# Patient Record
Sex: FEMALE | Race: WHITE
[De-identification: names, ages, dates, MRNs, and addresses within clinical notes are randomized per-mention and may not be internally consistent; named-entity substitution may affect disease eponyms.]

---

## 2017-06-12 ENCOUNTER — HOSPITAL ENCOUNTER (OUTPATIENT)
Dept: HOSPITAL 75 - RAD | Age: 62
End: 2017-06-12
Attending: INTERNAL MEDICINE
Payer: MEDICAID

## 2017-06-12 DIAGNOSIS — C50.212: Primary | ICD-10-CM

## 2017-07-11 ENCOUNTER — HOSPITAL ENCOUNTER (OUTPATIENT)
Dept: HOSPITAL 75 - ONC | Age: 62
LOS: 81 days | Discharge: HOME | End: 2017-09-30
Attending: INTERNAL MEDICINE
Payer: MEDICAID

## 2017-07-11 DIAGNOSIS — E66.9: ICD-10-CM

## 2017-07-11 DIAGNOSIS — Z92.3: ICD-10-CM

## 2017-07-11 DIAGNOSIS — C50.212: Primary | ICD-10-CM

## 2017-07-11 DIAGNOSIS — I10: ICD-10-CM

## 2017-07-11 DIAGNOSIS — Z17.0: ICD-10-CM

## 2017-07-11 DIAGNOSIS — E11.9: ICD-10-CM

## 2017-07-11 DIAGNOSIS — F17.210: ICD-10-CM

## 2017-07-11 DIAGNOSIS — Z90.10: ICD-10-CM

## 2017-07-11 DIAGNOSIS — Z92.21: ICD-10-CM

## 2017-07-11 LAB
ALBUMIN SERPL-MCNC: 4 GM/DL (ref 3.2–4.5)
ALT SERPL-CCNC: 13 U/L (ref 0–55)
ANION GAP SERPL CALC-SCNC: 8 MMOL/L (ref 5–14)
AST SERPL-CCNC: 11 U/L (ref 5–34)
BASOPHILS # BLD AUTO: 0.1 10^3/UL (ref 0–0.1)
BASOPHILS NFR BLD AUTO: 1 % (ref 0–10)
BILIRUB SERPL-MCNC: 0.4 MG/DL (ref 0.1–1)
BUN SERPL-MCNC: 12 MG/DL (ref 7–18)
BUN/CREAT SERPL: 16
CALCIUM SERPL-MCNC: 9.2 MG/DL (ref 8.5–10.1)
CHLORIDE SERPL-SCNC: 104 MMOL/L (ref 98–107)
CO2 SERPL-SCNC: 30 MMOL/L (ref 21–32)
CREAT SERPL-MCNC: 0.73 MG/DL (ref 0.6–1.3)
EOSINOPHIL # BLD AUTO: 0.1 10^3/UL (ref 0–0.3)
EOSINOPHIL NFR BLD AUTO: 1 % (ref 0–10)
ERYTHROCYTE [DISTWIDTH] IN BLOOD BY AUTOMATED COUNT: 12.6 % (ref 10–14.5)
GFR SERPLBLD BASED ON 1.73 SQ M-ARVRAT: > 60 ML/MIN
GLUCOSE SERPL-MCNC: 98 MG/DL (ref 70–105)
LYMPHOCYTES # BLD AUTO: 3.2 X 10^3 (ref 1–4)
LYMPHOCYTES NFR BLD AUTO: 29 % (ref 12–44)
MCH RBC QN AUTO: 33 PG (ref 25–34)
MCHC RBC AUTO-ENTMCNC: 35 G/DL (ref 32–36)
MCV RBC AUTO: 97 FL (ref 80–99)
MONOCYTES # BLD AUTO: 0.6 X 10^3 (ref 0–1)
MONOCYTES NFR BLD AUTO: 5 % (ref 0–12)
NEUTROPHILS # BLD AUTO: 7.2 X 10^3 (ref 1.8–7.8)
NEUTROPHILS NFR BLD AUTO: 64 % (ref 42–75)
PLATELET # BLD: 188 10^3/UL (ref 130–400)
PMV BLD AUTO: 11.4 FL (ref 7.4–10.4)
POTASSIUM SERPL-SCNC: 3.8 MMOL/L (ref 3.6–5)
PROT SERPL-MCNC: 7.1 GM/DL (ref 6.4–8.2)
RBC # BLD AUTO: 4.7 10^6/UL (ref 4.35–5.85)
SODIUM SERPL-SCNC: 142 MMOL/L (ref 135–145)
WBC # BLD AUTO: 11.1 10^3/UL (ref 4.3–11)

## 2017-07-11 PROCEDURE — 80053 COMPREHEN METABOLIC PANEL: CPT

## 2017-07-11 PROCEDURE — 85025 COMPLETE CBC W/AUTO DIFF WBC: CPT

## 2017-07-11 PROCEDURE — 36415 COLL VENOUS BLD VENIPUNCTURE: CPT

## 2017-07-11 PROCEDURE — 99213 OFFICE O/P EST LOW 20 MIN: CPT

## 2017-07-11 PROCEDURE — 83036 HEMOGLOBIN GLYCOSYLATED A1C: CPT

## 2018-02-08 ENCOUNTER — HOSPITAL ENCOUNTER (OUTPATIENT)
Dept: HOSPITAL 75 - RAD | Age: 63
End: 2018-02-08
Attending: NURSE PRACTITIONER
Payer: MEDICAID

## 2018-02-08 DIAGNOSIS — C50.212: Primary | ICD-10-CM

## 2018-02-08 DIAGNOSIS — Z78.0: ICD-10-CM

## 2018-02-08 DIAGNOSIS — M85.88: ICD-10-CM

## 2018-02-08 DIAGNOSIS — E11.9: ICD-10-CM

## 2018-02-08 PROCEDURE — 77080 DXA BONE DENSITY AXIAL: CPT

## 2018-02-08 NOTE — DIAGNOSTIC IMAGING REPORT
INDICATION: Diabetes, osteoporosis. 



COMPARISON: 9/13/2013. 



FINDINGS: Bone mineralization of the lumbar spine is 0.979.

Previously this was measured at 1.032. There has been an interval

reduction in bone mineralization of 5.1%. The lumbar T-score is

-1.8. Bone mineralization of the left hip is 0.901, the right hip

is 0.856. The left hip T-score is -0.8, the right is -1.1.

Previously the bone mineralization in the hips was 1.110. This

represents a reduction of 20.5%.



IMPRESSION: Osteopenia of the right hip and lumbar spine.

Borderline normal bone mineralization of the left hip. 



Dictated by: 



  Dictated on workstation # [id number]

## 2018-06-15 ENCOUNTER — HOSPITAL ENCOUNTER (OUTPATIENT)
Dept: HOSPITAL 75 - RAD | Age: 63
End: 2018-06-15
Attending: NURSE PRACTITIONER
Payer: MEDICAID

## 2018-06-15 DIAGNOSIS — C50.212: Primary | ICD-10-CM

## 2018-06-15 DIAGNOSIS — Z90.12: ICD-10-CM

## 2018-06-15 NOTE — DIAGNOSTIC IMAGING REPORT
INDICATION: 

Left breast carcinoma status post mastectomy.



COMPARISON:  

Prior right breast mammogram from 06/12/2017 and 06/06/2016.



TECHNIQUE: 

2D and 3D unilateral right diagnostic mammography was performed

with CAD.



FINDINGS:

Scattered fibroglandular densities in the right breast are

stable. There are benign calcifications in the right breast. No

spiculated mass or malignant appearing microcalcifications are

seen. Benign nodules in the upper outer right breast are noted,

consistent with intramammary lymph nodes. The right axilla is

unremarkable.



IMPRESSION:   

No mammographic features suspicious for malignancy are

identified.



ACR BI-RADS Category 2: Benign findings.

Result letter will be mailed to the patient.

Note: At least 10% of breast cancer is not imaged by mammography.



Dictated by: 



  Dictated on workstation # [id number]

## 2018-07-26 ENCOUNTER — HOSPITAL ENCOUNTER (OUTPATIENT)
Dept: HOSPITAL 75 - ONC | Age: 63
LOS: 5 days | Discharge: HOME | End: 2018-07-31
Attending: INTERNAL MEDICINE
Payer: MEDICAID

## 2018-07-26 DIAGNOSIS — Z90.10: ICD-10-CM

## 2018-07-26 DIAGNOSIS — F17.210: ICD-10-CM

## 2018-07-26 DIAGNOSIS — I10: ICD-10-CM

## 2018-07-26 DIAGNOSIS — Z17.0: ICD-10-CM

## 2018-07-26 DIAGNOSIS — C50.212: Primary | ICD-10-CM

## 2018-07-26 DIAGNOSIS — E66.9: ICD-10-CM

## 2018-07-26 DIAGNOSIS — E11.9: ICD-10-CM

## 2018-07-26 DIAGNOSIS — Z92.21: ICD-10-CM

## 2018-07-26 DIAGNOSIS — Z92.3: ICD-10-CM

## 2018-07-26 LAB
ALBUMIN SERPL-MCNC: 4.3 GM/DL (ref 3.2–4.5)
ALP SERPL-CCNC: 79 U/L (ref 40–136)
ALT SERPL-CCNC: 16 U/L (ref 0–55)
BASOPHILS # BLD AUTO: 0 10^3/UL (ref 0–0.1)
BASOPHILS NFR BLD AUTO: 0 % (ref 0–10)
BILIRUB SERPL-MCNC: 0.6 MG/DL (ref 0.1–1)
BUN/CREAT SERPL: 24
CALCIUM SERPL-MCNC: 9.3 MG/DL (ref 8.5–10.1)
CHLORIDE SERPL-SCNC: 104 MMOL/L (ref 98–107)
CO2 SERPL-SCNC: 27 MMOL/L (ref 21–32)
CREAT SERPL-MCNC: 0.83 MG/DL (ref 0.6–1.3)
EOSINOPHIL # BLD AUTO: 0.1 10^3/UL (ref 0–0.3)
EOSINOPHIL NFR BLD AUTO: 2 % (ref 0–10)
ERYTHROCYTE [DISTWIDTH] IN BLOOD BY AUTOMATED COUNT: 12.8 % (ref 10–14.5)
GFR SERPLBLD BASED ON 1.73 SQ M-ARVRAT: > 60 ML/MIN
GLUCOSE SERPL-MCNC: 117 MG/DL (ref 70–105)
HCT VFR BLD CALC: 44 % (ref 35–52)
HGB BLD-MCNC: 15.7 G/DL (ref 11.5–16)
LYMPHOCYTES # BLD AUTO: 3.2 X 10^3 (ref 1–4)
LYMPHOCYTES NFR BLD AUTO: 35 % (ref 12–44)
MANUAL DIFFERENTIAL PERFORMED BLD QL: NO
MCH RBC QN AUTO: 34 PG (ref 25–34)
MCHC RBC AUTO-ENTMCNC: 36 G/DL (ref 32–36)
MCV RBC AUTO: 95 FL (ref 80–99)
MONOCYTES # BLD AUTO: 0.6 X 10^3 (ref 0–1)
MONOCYTES NFR BLD AUTO: 6 % (ref 0–12)
NEUTROPHILS # BLD AUTO: 5.2 X 10^3 (ref 1.8–7.8)
NEUTROPHILS NFR BLD AUTO: 57 % (ref 42–75)
PLATELET # BLD: 164 10^3/UL (ref 130–400)
PMV BLD AUTO: 11.2 FL (ref 7.4–10.4)
POTASSIUM SERPL-SCNC: 4.6 MMOL/L (ref 3.6–5)
PROT SERPL-MCNC: 7.6 GM/DL (ref 6.4–8.2)
RBC # BLD AUTO: 4.59 10^6/UL (ref 4.35–5.85)
SODIUM SERPL-SCNC: 139 MMOL/L (ref 135–145)
WBC # BLD AUTO: 9.2 10^3/UL (ref 4.3–11)

## 2018-07-26 PROCEDURE — 99213 OFFICE O/P EST LOW 20 MIN: CPT

## 2018-07-26 PROCEDURE — 85025 COMPLETE CBC W/AUTO DIFF WBC: CPT

## 2018-07-26 PROCEDURE — 36415 COLL VENOUS BLD VENIPUNCTURE: CPT

## 2018-07-26 PROCEDURE — 80053 COMPREHEN METABOLIC PANEL: CPT

## 2019-07-08 ENCOUNTER — HOSPITAL ENCOUNTER (OUTPATIENT)
Dept: HOSPITAL 75 - RAD | Age: 64
End: 2019-07-08
Attending: INTERNAL MEDICINE
Payer: MEDICAID

## 2019-07-08 DIAGNOSIS — C50.919: ICD-10-CM

## 2019-07-08 DIAGNOSIS — Z12.31: Primary | ICD-10-CM

## 2019-07-08 NOTE — DIAGNOSTIC IMAGING REPORT
INDICATION: 

Left breast carcinoma.



COMPARISON: 

06/15/2018 and 06/12/2017.



TECHNIQUE: 

Right 2D and 3D diagnostic mammography was performed with CAD.



FINDINGS:

Scattered fibroglandular densities in the right breast are noted.

There are benign calcifications. No spiculated mass or malignant

appearing microcalcifications are seen. The right axilla is

unremarkable.



IMPRESSION: 

No mammographic features suspicious for malignancy are

identified.



ACR BI-RADS Category 2: Benign findings.

Result letter will be mailed to the patient.

Note: At least 10% of breast cancer is not imaged by mammography.



Dictated by: 



  Dictated on workstation # [id number]

## 2019-07-26 ENCOUNTER — HOSPITAL ENCOUNTER (OUTPATIENT)
Dept: HOSPITAL 75 - ONC | Age: 64
End: 2019-07-26
Attending: INTERNAL MEDICINE
Payer: MEDICAID

## 2019-07-26 DIAGNOSIS — C50.919: Primary | ICD-10-CM

## 2019-07-26 LAB
ALBUMIN SERPL-MCNC: 4.3 GM/DL (ref 3.2–4.5)
ALP SERPL-CCNC: 96 U/L (ref 40–136)
ALT SERPL-CCNC: 44 U/L (ref 0–55)
BASOPHILS # BLD AUTO: 0 10^3/UL (ref 0–0.1)
BASOPHILS NFR BLD AUTO: 0 % (ref 0–10)
BILIRUB SERPL-MCNC: 0.7 MG/DL (ref 0.1–1)
BUN/CREAT SERPL: 14
CALCIUM SERPL-MCNC: 9.8 MG/DL (ref 8.5–10.1)
CHLORIDE SERPL-SCNC: 103 MMOL/L (ref 98–107)
CO2 SERPL-SCNC: 24 MMOL/L (ref 21–32)
CREAT SERPL-MCNC: 0.91 MG/DL (ref 0.6–1.3)
EOSINOPHIL # BLD AUTO: 0.2 10^3/UL (ref 0–0.3)
EOSINOPHIL NFR BLD AUTO: 2 % (ref 0–10)
ERYTHROCYTE [DISTWIDTH] IN BLOOD BY AUTOMATED COUNT: 12.6 % (ref 10–14.5)
GFR SERPLBLD BASED ON 1.73 SQ M-ARVRAT: > 60 ML/MIN
GLUCOSE SERPL-MCNC: 147 MG/DL (ref 70–105)
HCT VFR BLD CALC: 45 % (ref 35–52)
HGB BLD-MCNC: 15.7 G/DL (ref 11.5–16)
LYMPHOCYTES # BLD AUTO: 2.8 X 10^3 (ref 1–4)
LYMPHOCYTES NFR BLD AUTO: 39 % (ref 12–44)
MANUAL DIFFERENTIAL PERFORMED BLD QL: NO
MCH RBC QN AUTO: 32 PG (ref 25–34)
MCHC RBC AUTO-ENTMCNC: 35 G/DL (ref 32–36)
MCV RBC AUTO: 93 FL (ref 80–99)
MONOCYTES # BLD AUTO: 0.4 X 10^3 (ref 0–1)
MONOCYTES NFR BLD AUTO: 5 % (ref 0–12)
NEUTROPHILS # BLD AUTO: 3.8 X 10^3 (ref 1.8–7.8)
NEUTROPHILS NFR BLD AUTO: 53 % (ref 42–75)
PLATELET # BLD: 156 10^3/UL (ref 130–400)
PMV BLD AUTO: 11.4 FL (ref 7.4–10.4)
POTASSIUM SERPL-SCNC: 4.2 MMOL/L (ref 3.6–5)
PROT SERPL-MCNC: 7.8 GM/DL (ref 6.4–8.2)
SODIUM SERPL-SCNC: 137 MMOL/L (ref 135–145)
WBC # BLD AUTO: 7.2 10^3/UL (ref 4.3–11)

## 2019-07-26 PROCEDURE — 85025 COMPLETE CBC W/AUTO DIFF WBC: CPT

## 2019-07-26 PROCEDURE — 80053 COMPREHEN METABOLIC PANEL: CPT

## 2019-07-26 PROCEDURE — 99213 OFFICE O/P EST LOW 20 MIN: CPT

## 2019-07-26 PROCEDURE — 36415 COLL VENOUS BLD VENIPUNCTURE: CPT

## 2021-12-22 NOTE — DIAGNOSTIC IMAGING REPORT
Right breast diagnostic mammogram.



INDICATION: History of breast cancer status post left mastectomy.



CAD is utilized.



COMPARISON: 6/4/2015.



FINDINGS:

The right breast is composed of scattered fibroglandular

densities. Punctate calcifications are seen. No mass,

architectural distortion, or suspicious cluster of calcification

noted. Allowing for technique and positional differences, no

suspicious change is seen.



IMPRESSION: No significant change.



ACR BI-RADS Category 2: Benign findings.

Result letter will be mailed to the patient.

Note: At least 10% of breast cancer is not imaged by mammography.



Dictated by: 



  Dictated on workstation # [id number]
denies pain/discomfort

## 2023-06-27 ENCOUNTER — HOSPITAL ENCOUNTER (EMERGENCY)
Dept: HOSPITAL 75 - ER | Age: 68
LOS: 1 days | Discharge: ANOTHER HOSPITAL | End: 2023-06-28
Payer: MEDICARE

## 2023-06-27 DIAGNOSIS — E11.9: ICD-10-CM

## 2023-06-27 DIAGNOSIS — E66.9: ICD-10-CM

## 2023-06-27 DIAGNOSIS — R70.1: ICD-10-CM

## 2023-06-27 DIAGNOSIS — I50.9: Primary | ICD-10-CM

## 2023-06-27 LAB
APTT BLD: 45 SEC (ref 24–35)
BASOPHILS # BLD AUTO: 0 10^3/UL (ref 0–0.1)
BASOPHILS NFR BLD AUTO: 0 % (ref 0–10)
EOSINOPHIL # BLD AUTO: 0.1 10^3/UL (ref 0–0.3)
EOSINOPHIL NFR BLD AUTO: 1 % (ref 0–10)
HCT VFR BLD CALC: 34 % (ref 35–52)
HGB BLD-MCNC: 10 G/DL (ref 11.5–16)
INR PPP: 1.5 (ref 0.8–1.4)
LYMPHOCYTES # BLD AUTO: 6 10^3/UL (ref 1–4)
LYMPHOCYTES NFR BLD AUTO: 59 % (ref 12–44)
MCH RBC QN AUTO: 29 PG (ref 25–34)
MCHC RBC AUTO-ENTMCNC: 30 G/DL (ref 32–36)
MCV RBC AUTO: 99 FL (ref 80–99)
MONOCYTES # BLD AUTO: 0.9 10^3/UL (ref 0–1)
MONOCYTES NFR BLD AUTO: 8 % (ref 0–12)
NEUTROPHILS # BLD AUTO: 3.2 10^3/UL (ref 1.8–7.8)
NEUTROPHILS NFR BLD AUTO: 32 % (ref 42–75)
PLATELET # BLD: 185 10^3/UL (ref 130–400)
PMV BLD AUTO: 10.5 FL (ref 9–12.2)
PROTHROMBIN TIME: 18.6 SEC (ref 12.2–14.7)
WBC # BLD AUTO: 10.2 10^3/UL (ref 4.3–11)

## 2023-06-27 PROCEDURE — 85730 THROMBOPLASTIN TIME PARTIAL: CPT

## 2023-06-27 PROCEDURE — 84484 ASSAY OF TROPONIN QUANT: CPT

## 2023-06-27 PROCEDURE — 85610 PROTHROMBIN TIME: CPT

## 2023-06-27 PROCEDURE — 93005 ELECTROCARDIOGRAM TRACING: CPT

## 2023-06-27 PROCEDURE — 70450 CT HEAD/BRAIN W/O DYE: CPT

## 2023-06-27 PROCEDURE — 36415 COLL VENOUS BLD VENIPUNCTURE: CPT

## 2023-06-27 PROCEDURE — 80053 COMPREHEN METABOLIC PANEL: CPT

## 2023-06-27 PROCEDURE — 71045 X-RAY EXAM CHEST 1 VIEW: CPT

## 2023-06-27 PROCEDURE — 85025 COMPLETE CBC W/AUTO DIFF WBC: CPT

## 2023-06-27 NOTE — DIAGNOSTIC IMAGING REPORT
INDICATION: Weakness and shortness of breath.



FINDINGS: The heart size is normal. There is mild venous

congestion. There is elevation of the right hemidiaphragm. There

is no pleural effusion or pneumothorax.  The mediastinum is

unremarkable. 



IMPRESSION: Mild central pulmonary venous congestion, otherwise

unremarkable.



Dictated by: 



  Dictated on workstation # [id number]

## 2023-06-27 NOTE — ED GENERAL
General


Stated Complaint:  WEAKNESS


Source of Information:  Patient, Family


Exam Limitations:  No Limitations





History of Present Illness


Date Seen by Provider:  2023


Time Seen by Provider:  19:55


Initial Comments


Patient is a 67-year-old female who presents to the emergency room, transferred 

from [facility] chief complaint concern for hyperviscous blood, 

dyspnea on exertion, weakness, blurry vision, dizziness.  Patient has had the 

symptoms progressing over the course of the last week (to up to 3 weeks).  She 

went to the emergency department in [facility] and they were unable to run 

labs due to how thick her blood was.  She tells me in March she had pneumonia 

and had "normal" blood work done.  She has a history of breast cancer treated at

least 10/12 years ago.  No follow-up in the last 7y with oncology.  She denies 

blood in her urine.  She denies blood in her stool.  She does "easily bruise".  

Noted to have petechial rash to the dorsum of her feet as well as her left arm. 

She has a history of Mnire's disease so dizziness is not unusual.  She states 

she has diffuse pain in her ribs, back, hips and legs.  No history of 

hematological malignancy.  She denies headache.





The reason they became concerned with ongoing symptoms - she checked her blood 

sugar 1 week ago today on a family member's monitor and it was "480".  Otherwise

glucose monitors have been unable to read her sugar.  She was Type to DM many 

years ago, on metformin but lost weight and changed lifestyle and came off 

metformin. Due to symptoms recently, patient has been taking her 's 

metformin (only a couple of doses the last week).





Abiola tells me she had a brother die of a "blood cancer" (not sure what it 

was).





Dr. Power, the emergency room physician at [facility] spoke with Dr. Cruz

our oncologist.  She recommended transfer to [facility] for further evaluation,

possible admission.


Timing/Duration:  1 Week


Severity:  Moderate


Associated Systoms:  Malaise, Shortness of Air, Weakness





Allergies and Home Medications


Allergies


Coded Allergies:  


     adhesive (Unverified  Allergy, Unknown, 23)





Patient Home Medication List


Home Medication List Reviewed:  Yes


Codeine Phos/Acetaminophen (Tylenol-Codeine #3 Tablet) 1 Tab Tablet, 1-2 TAB PO 

Q4H PRN for PAIN


   Prescribed by: MECHELLE REGAN on 14 1556


Metformin Hcl (Metformin 1000 Mg) 1,000 Mg Tablet, 1 EACH PO DAILY, (Reported)


   Entered as Reported by: ELLIOTT CERRATO on 4/3/14 1647


[Med For Ca Name?]  , PO DAILY, (Reported)


   Entered as Reported by: ELLIOTT CERRATO on 4/3/14 1647





Review of Systems


Review of Systems


Constitutional:  see HPI, malaise, weakness


EENTM:  blurred vision


Respiratory:  short of breath


Cardiovascular:  no symptoms reported


Gastrointestinal:  no symptoms reported


Genitourinary:  no symptoms reported


Musculoskeletal:  other (body aches)


Skin:  other (bruising)


Psychiatric/Neurological:  No Symptoms Reported


Hematologic/Lymphatic:  Easy Bruising





All Other Systems Reviewed


Negative Unless Noted:  Yes





Past Medical-Social-Family Hx


Past Medical History


Reproductive Disorders:  Yes


Female Reproductive Disorders:  Ovarian Cyst


Breast





Physical Exam


Vital Signs





Vital Signs - First Documented








 23





 19:58


 


Pulse 99


 


Resp 22


 


B/P (MAP) 181/106 (131)


 


Pulse Ox 95


 


O2 Delivery Room Air





Capillary Refill :


Height, Weight, BMI


Height: 5'1.00"


Weight: 199lbs. 0.0oz. 90.775126gs;  BMI


Method:


General Appearance:  No Apparent Distress, WD/WN, Obese


Eyes:  Bilateral Eye Normal Inspection, Bilateral Eye PERRL, Bilateral Eye EOMI


HEENT:  PERRL/EOMI, Moist Mucous Membranes


Neck:  Normal Inspection


Respiratory:  No Accessory Muscle Use, No Respiratory Distress, Other (coarse 

ronchus BS throughout)


Cardiovascular:  Regular Rate, Rhythm, Normal Peripheral Pulses, Tachycardia 

(100)


Gastrointestinal:  Non Tender, Soft


Extremity:  Normal Range of Motion, Pedal Edema (1-2+)


Neurologic/Psychiatric:  Alert, Oriented x3, No Motor/Sensory Deficits, Normal M

ood/Affect, CNs II-XII Norm as Tested


Skin:  Warm/Dry, Pallor ("off color" purplish blue (not actually cyanotic 

however)), Petechia (dorsum of feet bilat; left arm; scattered to ant abd)





Progress/Results/Core Measures


Suspected Sepsis


SIRS


Temperature: 


Pulse:  


Respiratory Rate: 


 


Laboratory Tests


23 20:10: White Blood Count 10.2


Blood Pressure  / 


Mean: 


 





Laboratory Tests


23 20:10: 


INR Comment 1.5H, Platelet Count 185








Results/Orders


Lab Results





Laboratory Tests








Test


 23


20:10 Range/Units


 


 


White Blood Count


 10.2 


 4.3-11.0


10^3/uL


 


Red Blood Count


 3.40 L


 3.80-5.11


10^6/uL


 


Hemoglobin 10.0 L 11.5-16.0  g/dL


 


Hematocrit 34 L 35-52  %


 


Mean Corpuscular Volume 99  80-99  fL


 


Mean Corpuscular Hemoglobin 29  25-34  pg


 


Mean Corpuscular Hemoglobin


Concent 30 L


 32-36  g/dL





 


Red Cell Distribution Width 14.6 H 10.0-14.5  %


 


Platelet Count


 185 


 130-400


10^3/uL


 


Mean Platelet Volume 10.5  9.0-12.2  fL


 


Immature Granulocyte % (Auto) 1   %


 


Neutrophils (%) (Auto) 32 L 42-75  %


 


Lymphocytes (%) (Auto) 59 H 12-44  %


 


Monocytes (%) (Auto) 8  0-12  %


 


Eosinophils (%) (Auto) 1  0-10  %


 


Basophils (%) (Auto) 0  0-10  %


 


Neutrophils # (Auto)


 3.2 


 1.8-7.8


10^3/uL


 


Lymphocytes # (Auto)


 6.0 H


 1.0-4.0


10^3/uL


 


Monocytes # (Auto)


 0.9 


 0.0-1.0


10^3/uL


 


Eosinophils # (Auto)


 0.1 


 0.0-0.3


10^3/uL


 


Basophils # (Auto)


 0.0 


 0.0-0.1


10^3/uL


 


Immature Granulocyte # (Auto)


 0.1 


 0.0-0.1


10^3/uL


 


Percent Immature Platelet


Fraction 0.8 


 0.0-7.6  %





 


Prothrombin Time 18.6 H 12.2-14.7  SEC


 


INR Comment 1.5 H 0.8-1.4  


 


Activated Partial


Thromboplast Time 45 H


 24-35  SEC





 


Smear Scan    








My Orders





Orders - DANIEL KHALIL MD


Ed Iv/Invasive Line Start (23 20:22)


Cbc With Automated Diff (23 20:22)


Comprehensive Metabolic Panel (23 20:22)


Protime With Inr (23 20:22)


Partial Thromboplastin Time (23 20:22)


Chest 1 View, Ap/Pa Only (23 20:22)


Ekg Tracing (23 20:23)


Bnp Terrell (23 20:23)


Troponin I Radha (23 20:23)


I-Stat Bedside Testing (23 21:27)


Ct Head Wo (23 21:42)


Accucheck Stat ONCE (23 22:55)


Ns Iv 1000 Ml (Sodium Chloride 0.9%) (23 23:02)





Vital Signs/I&O











 23





 19:58


 


Pulse 99


 


Resp 22


 


B/P (MAP) 181/106 (131)


 


Pulse Ox 95


 


O2 Delivery Room Air





Capillary Refill :


Progress Note #1:  


   Time:  21:29


Progress Note


Notified by lab that the analyzers are unable to perform CBC and CH12 due to the

increased thickness of the patient's blood.  is attempting dilutional 

techniques.  Will try and at least Istat basic chemistry.


Progress Note #2:  


   Time:  22:23


Progress Note


Discussed with Dr Partida (oncology); requested call to pathologist for further 

attempts at "diluting" blood.  Spoke with Dr Ferreira (pathology) @ 2222 She 

will call the lab to see what else can be done.  CT head noncontrast back at 

this time - nothing acute per Stat Rad.


Progress Note #3:  


   Time:  00:50


Progress Note


Patient seen and evaluated by me.  Evaluation today includes physical exam, CBC,

Chem-12, troponin, BNP, EKG, single view chest x-ray, CT head noncontrast.  

Pertinent physical exam findings well-developed well-nourished pleasant female 

in no acute distress.  Hypertensive in the 190s over 110 range on presentation 

with a heart rate of 100.  Afebrile.  Room air oxygen saturations 93%.  Co

njunctive a are little pale.  Mucous membranes are moist.  Lungs are rhonchorous

throughout, no wheezes, no increased work of breathing noted.  Abdomen is soft, 

nontender.  No focal neurologic deficits are noted.  She does have 1-2+ pitting 

edema in the bilateral lower extremities.  She does have petechial rash to the 

dorsum of her feet bilaterally as well as notable on the left arm and anterior 

abdominal wall.





Differential diagnosis based on history and physical exam thrombocytopenia/ITP, 

CHF.  Hematologic malignancy.





Labs limited secondary to the hyperviscosity of the patient's blood.  As 

documented after speaking with Dr. Ferreira the lab did a 1-5 dilution on the 

patient's CBC.  When her blood after dilution came off the analyzer the white 

count read as 2.03.  Then this multiplied by the "5" dilution gives her 

hemoglobin of 10.  The lab states they are unable to dilute for electrolytes.  

No chemistry, troponin, BNP is obtained.  Coags were obtained and they are 

significantly elevated.  Patient has continued to be hypertensive.  She was 

treated with a liter of normal saline.  This would be her third during the 

course of the day.  I did attempt bedside Accu-Chek which was unobtainable.  We 

also attempted to do i-STAT chemistry also unobtainable.  Patient has been 

resting comfortably.  No headache.  No nausea currently.  She states that she is

sleepy.





Case was discussed with [facility], accepted by .  Attempting to arrange 

transport at this time.





ECG


Initial ECG Impression Date:  2023


Initial ECG Impression Time:  21:18


Initial ECG Rate:  91


Initial ECG Rhythm:  Normal Sinus


Comment


NSSTW changes throughout; Inverted T waves lead V3





Diagnostic Imaging





   Diagonstic Imaging:  Xray


   Plain Films/CT/US/NM/MRI:  chest


Comments


                 [facility].


                                [place], [place]





NAME:   ABIOLA DEL ROSARIO


MED REC#:   [id number]


ACCOUNT#:   [id number]


PT STATUS:   REG ER


:   1955


PHYSICIAN:   DANIEL KHALIL MD


ADMIT DATE:   23/ER


                                  ***Signed***


Date of Exam:23





CHEST 1 VIEW, AP/PA ONLY








INDICATION: Weakness and shortness of breath.





FINDINGS: The heart size is normal. There is mild venous


congestion. There is elevation of the right hemidiaphragm. There


is no pleural effusion or pneumothorax.  The mediastinum is


unremarkable. 





IMPRESSION: Mild central pulmonary venous congestion, otherwise


unremarkable.





Dictated by: 





  Dictated on workstation # [id number]








Dict:   23


Trans:   23


[facility] [id number]





Interpreted by:     EWELINA MOREIRA MD


Electronically signed by: EWELINA MOREIRA MD 23








   Diagonstic Imaging:  CT


Comments


CT head non contrast - per Stat Rad - no acute findings





Departure


Impression





   Primary Impression:  


   Hyperviscosity syndrome


   Additional Impression:  


   Mild congestive heart failure


Disposition:   XFER SHT-TRM HOSP


Condition:  Stable





Transfer


Transfer Reason:  Exceeds level of care


Time Spoke to Accepting Phy:  00:50


Transfer Progress Notes


Discussed with  Transfer LIne, Accepted by John on behalf of Dr Kauffman


Transfer Facility:  








Departure-Patient Inst.


Referrals:  


SHONDA MOYA MD (PCP/Family)


Primary Care Physician





Copy


Copies To 1:   SHONDA MOYA MD, KATHRYN M MD         2023 19:57

## 2023-06-28 VITALS — DIASTOLIC BLOOD PRESSURE: 96 MMHG | SYSTOLIC BLOOD PRESSURE: 156 MMHG

## 2023-06-28 LAB
ALBUMIN SERPL-MCNC: 1.9 GM/DL (ref 3.2–4.5)
ALP SERPL-CCNC: 42 U/L (ref 40–136)
ALT SERPL-CCNC: 26 U/L (ref 0–55)
BILIRUB SERPL-MCNC: 0.4 MG/DL (ref 0.1–1)
BUN/CREAT SERPL: 10
CALCIUM SERPL-MCNC: 6.6 MG/DL (ref 8.5–10.1)
CHLORIDE SERPL-SCNC: (no result) MMOL/L (ref 98–107)
CO2 SERPL-SCNC: 16 MMOL/L (ref 21–32)
CREAT SERPL-MCNC: 0.94 MG/DL (ref 0.6–1.3)
GFR SERPLBLD BASED ON 1.73 SQ M-ARVRAT: 67 ML/MIN
GLUCOSE SERPL-MCNC: 197 MG/DL (ref 70–105)
MANUAL DIFFERENTIAL PERFORMED BLD QL: NO
POTASSIUM SERPL-SCNC: (no result) MMOL/L (ref 3.6–5)
PROT SERPL-MCNC: 5.9 GM/DL (ref 6.4–8.2)
SODIUM SERPL-SCNC: (no result) MMOL/L (ref 135–145)

## 2023-06-28 NOTE — DIAGNOSTIC IMAGING REPORT
PROCEDURE: CT head without contrast.



TECHNIQUE: Multiple contiguous axial images were obtained through

the brain without the use of intravenous contrast. Auto Exposure

Controls were utilized during the CT exam to meet ALARA standards

for radiation dose reduction. 



INDICATION:  Dizziness and shortness of breath, weakness



The ventricles are normal in size, shape and position. There are

no masses or hemorrhages. There are no extra-axial fluid

collections.



IMPRESSION: Negative CT head.



I agree with preliminary interpretation.



Dictated by: 



  Dictated on workstation # [id number]